# Patient Record
Sex: FEMALE | Race: WHITE | NOT HISPANIC OR LATINO | URBAN - METROPOLITAN AREA
[De-identification: names, ages, dates, MRNs, and addresses within clinical notes are randomized per-mention and may not be internally consistent; named-entity substitution may affect disease eponyms.]

---

## 2021-01-05 NOTE — PATIENT DISCUSSION
Stopped Today: Pred Forte (prednisolone acetate): drops,suspension: 1% 1 drop four times a day as directed into left eye 12-

## 2021-01-05 NOTE — PATIENT DISCUSSION
Stopped Today: ofloxacin (ofloxacin): drops: 0.3% 1 drop four times a day as directed into right eye

## 2022-02-16 ENCOUNTER — NEW PATIENT (OUTPATIENT)
Dept: URBAN - METROPOLITAN AREA CLINIC 14 | Facility: CLINIC | Age: 62
End: 2022-02-16

## 2022-02-16 DIAGNOSIS — H25.13: ICD-10-CM

## 2022-02-16 PROCEDURE — 92004 COMPRE OPH EXAM NEW PT 1/>: CPT

## 2022-02-16 PROCEDURE — 92015 DETERMINE REFRACTIVE STATE: CPT

## 2022-02-16 ASSESSMENT — VISUAL ACUITY
OS_BCVA: 20/25
OD_BCVA: 20/30
OU_CC: 20/60
OS_CC: 20/70
OD_CC: 20/70

## 2022-02-16 ASSESSMENT — KERATOMETRY
OS_AXISANGLE2_DEGREES: 56
OD_K1POWER_DIOPTERS: 40.75
OD_AXISANGLE_DEGREES: 106
OS_K1POWER_DIOPTERS: 41.50
OS_AXISANGLE_DEGREES: 146
OS_K2POWER_DIOPTERS: 42.25
OD_K2POWER_DIOPTERS: 41.00
OD_AXISANGLE2_DEGREES: 16

## 2022-02-16 ASSESSMENT — TONOMETRY
OD_IOP_MMHG: 13
OS_IOP_MMHG: 14

## 2024-02-19 ENCOUNTER — EMERGENCY VISIT (OUTPATIENT)
Dept: URBAN - METROPOLITAN AREA CLINIC 16 | Facility: CLINIC | Age: 64
End: 2024-02-19

## 2024-02-19 DIAGNOSIS — H00.15: ICD-10-CM

## 2024-02-19 DIAGNOSIS — H02.886: ICD-10-CM

## 2024-02-19 DIAGNOSIS — H02.883: ICD-10-CM

## 2024-02-19 PROCEDURE — 99214 OFFICE O/P EST MOD 30 MIN: CPT

## 2024-02-19 ASSESSMENT — VISUAL ACUITY
OU_CC: 20/25
OD_CC: 20/70
OS_CC: 20/20-1

## 2024-02-19 ASSESSMENT — KERATOMETRY
OD_AXISANGLE_DEGREES: 106
OD_K2POWER_DIOPTERS: 41.00
OS_K2POWER_DIOPTERS: 42.25
OS_AXISANGLE2_DEGREES: 56
OS_AXISANGLE_DEGREES: 146
OD_AXISANGLE2_DEGREES: 16
OS_K1POWER_DIOPTERS: 41.50
OD_K1POWER_DIOPTERS: 40.75

## 2024-02-19 ASSESSMENT — TONOMETRY
OD_IOP_MMHG: 19
OS_IOP_MMHG: 19

## 2024-03-15 ENCOUNTER — COMPREHENSIVE EXAM (OUTPATIENT)
Dept: URBAN - METROPOLITAN AREA CLINIC 16 | Facility: CLINIC | Age: 64
End: 2024-03-15

## 2024-03-15 DIAGNOSIS — H02.886: ICD-10-CM

## 2024-03-15 DIAGNOSIS — H04.123: ICD-10-CM

## 2024-03-15 DIAGNOSIS — H02.883: ICD-10-CM

## 2024-03-15 DIAGNOSIS — H43.813: ICD-10-CM

## 2024-03-15 DIAGNOSIS — H25.13: ICD-10-CM

## 2024-03-15 PROCEDURE — 92014 COMPRE OPH EXAM EST PT 1/>: CPT

## 2024-03-15 PROCEDURE — 92015 DETERMINE REFRACTIVE STATE: CPT

## 2024-03-15 ASSESSMENT — KERATOMETRY
OD_AXISANGLE_DEGREES: 106
OD_K1POWER_DIOPTERS: 41.00
OD_AXISANGLE2_DEGREES: 128
OS_K1POWER_DIOPTERS: 41.50
OD_K2POWER_DIOPTERS: 40.50
OS_AXISANGLE_DEGREES: 139
OS_AXISANGLE2_DEGREES: 49
OD_K2POWER_DIOPTERS: 41.00
OS_AXISANGLE2_DEGREES: 56
OD_K1POWER_DIOPTERS: 40.75
OD_AXISANGLE_DEGREES: 38
OD_AXISANGLE2_DEGREES: 16
OS_K2POWER_DIOPTERS: 42.25
OS_AXISANGLE_DEGREES: 146

## 2024-03-15 ASSESSMENT — TONOMETRY
OD_IOP_MMHG: 18
OS_IOP_MMHG: 19

## 2024-03-15 ASSESSMENT — VISUAL ACUITY
OD_CC: 20/40+0
OS_CC: 20/25+2

## 2024-04-05 ENCOUNTER — FOLLOW UP (OUTPATIENT)
Dept: URBAN - METROPOLITAN AREA CLINIC 14 | Facility: CLINIC | Age: 64
End: 2024-04-05

## 2024-04-05 DIAGNOSIS — H02.883: ICD-10-CM

## 2024-04-05 DIAGNOSIS — H02.886: ICD-10-CM

## 2024-04-05 DIAGNOSIS — H25.13: ICD-10-CM

## 2024-04-05 DIAGNOSIS — H04.123: ICD-10-CM

## 2024-04-05 PROCEDURE — 99214 OFFICE O/P EST MOD 30 MIN: CPT

## 2024-04-05 ASSESSMENT — KERATOMETRY
OS_AXISANGLE_DEGREES: 146
OD_K1POWER_DIOPTERS: 40.75
OD_AXISANGLE_DEGREES: 106
OD_AXISANGLE_DEGREES: 38
OS_AXISANGLE2_DEGREES: 49
OD_AXISANGLE2_DEGREES: 16
OD_K2POWER_DIOPTERS: 41.00
OS_K2POWER_DIOPTERS: 42.25
OD_K2POWER_DIOPTERS: 40.50
OS_AXISANGLE_DEGREES: 139
OS_AXISANGLE2_DEGREES: 56
OD_K1POWER_DIOPTERS: 41.00
OD_AXISANGLE2_DEGREES: 128
OS_K1POWER_DIOPTERS: 41.50

## 2024-04-05 ASSESSMENT — VISUAL ACUITY
OS_CC: 20/20-2
OD_BCVA: 20/50
OD_PH: 20/30
OD_CC: 20/50+2

## 2024-04-05 ASSESSMENT — TONOMETRY
OD_IOP_MMHG: 16
OS_IOP_MMHG: 17

## 2024-04-08 ENCOUNTER — ESTABLISHED PATIENT (OUTPATIENT)
Dept: URBAN - METROPOLITAN AREA CLINIC 14 | Facility: CLINIC | Age: 64
End: 2024-04-08

## 2024-04-08 DIAGNOSIS — H25.13: ICD-10-CM

## 2024-04-08 DIAGNOSIS — Z98.890: ICD-10-CM

## 2024-04-08 PROCEDURE — 99214 OFFICE O/P EST MOD 30 MIN: CPT

## 2024-04-08 PROCEDURE — 99199ADVT ADVANCED VISION TESTING

## 2024-04-08 PROCEDURE — 92136 OPHTHALMIC BIOMETRY: CPT

## 2024-04-08 ASSESSMENT — VISUAL ACUITY
OD_BCVA: 20/50
OD_PH: 20/40
OS_BCVA: 20/50
OS_CC: 20/40-1
OD_CC: 20/60

## 2024-04-08 ASSESSMENT — KERATOMETRY
OS_AXISANGLE2_DEGREES: 56
OS_AXISANGLE_DEGREES: 146
OD_K1POWER_DIOPTERS: 40.75
OD_AXISANGLE2_DEGREES: 16
OD_K2POWER_DIOPTERS: 41.00
OS_K1POWER_DIOPTERS: 41.50
OD_AXISANGLE_DEGREES: 106
OS_K2POWER_DIOPTERS: 42.25

## 2024-04-08 ASSESSMENT — TONOMETRY
OD_IOP_MMHG: 10
OS_IOP_MMHG: 14

## 2024-04-16 ENCOUNTER — ESTABLISHED PATIENT (OUTPATIENT)
Dept: URBAN - METROPOLITAN AREA CLINIC 18 | Facility: CLINIC | Age: 64
End: 2024-04-16

## 2024-04-16 DIAGNOSIS — H35.372: ICD-10-CM

## 2024-04-16 DIAGNOSIS — H52.13: ICD-10-CM

## 2024-04-16 DIAGNOSIS — H35.413: ICD-10-CM

## 2024-04-16 DIAGNOSIS — H43.813: ICD-10-CM

## 2024-04-16 PROCEDURE — 92134 CPTRZ OPH DX IMG PST SGM RTA: CPT

## 2024-04-16 PROCEDURE — 92201 OPSCPY EXTND RTA DRAW UNI/BI: CPT

## 2024-04-16 PROCEDURE — 67145 PROPH RTA DTCHMNT PC: CPT

## 2024-04-16 PROCEDURE — 99214 OFFICE O/P EST MOD 30 MIN: CPT | Mod: 25

## 2024-04-16 ASSESSMENT — KERATOMETRY
OD_AXISANGLE_DEGREES: 106
OD_AXISANGLE2_DEGREES: 16
OS_K2POWER_DIOPTERS: 42.25
OS_AXISANGLE2_DEGREES: 56
OS_AXISANGLE_DEGREES: 146
OD_K1POWER_DIOPTERS: 40.75
OD_K2POWER_DIOPTERS: 41.00
OS_K1POWER_DIOPTERS: 41.50

## 2024-04-16 ASSESSMENT — VISUAL ACUITY
OD_PH: 20/50
OD_CC: 20/80
OS_CC: 20/25

## 2024-04-23 ENCOUNTER — CLINIC PROCEDURE ONLY (OUTPATIENT)
Dept: URBAN - METROPOLITAN AREA CLINIC 18 | Facility: CLINIC | Age: 64
End: 2024-04-23

## 2024-04-23 DIAGNOSIS — H35.413: ICD-10-CM

## 2024-04-23 PROCEDURE — 67145 PROPH RTA DTCHMNT PC: CPT

## 2024-04-23 ASSESSMENT — TONOMETRY
OD_IOP_MMHG: 11
OS_IOP_MMHG: 13

## 2024-04-23 ASSESSMENT — VISUAL ACUITY
OS_CC: 20/20
OD_PH: 20/25-2
OD_CC: 20/50-2

## 2024-05-03 ENCOUNTER — POST-OP (OUTPATIENT)
Dept: URBAN - METROPOLITAN AREA CLINIC 11 | Facility: CLINIC | Age: 64
End: 2024-05-03

## 2024-05-03 DIAGNOSIS — H35.413: ICD-10-CM

## 2024-05-03 DIAGNOSIS — H35.372: ICD-10-CM

## 2024-05-03 DIAGNOSIS — H43.813: ICD-10-CM

## 2024-05-03 PROCEDURE — 99024 POSTOP FOLLOW-UP VISIT: CPT

## 2024-05-03 PROCEDURE — 92134 CPTRZ OPH DX IMG PST SGM RTA: CPT

## 2024-05-03 ASSESSMENT — VISUAL ACUITY
OS_CC: 20/30
OU_CC: 20/30
OD_PH: 20/50
OD_CC: 20/70

## 2024-05-03 ASSESSMENT — TONOMETRY
OS_IOP_MMHG: 12
OD_IOP_MMHG: 10

## 2024-05-07 ENCOUNTER — POST-OP (OUTPATIENT)
Dept: URBAN - METROPOLITAN AREA CLINIC 10 | Facility: CLINIC | Age: 64
End: 2024-05-07

## 2024-05-07 DIAGNOSIS — Z96.1: ICD-10-CM

## 2024-05-07 PROCEDURE — P6698455 NON-COMANAGED ADVANCED PO

## 2024-05-08 ASSESSMENT — VISUAL ACUITY
OD_SC: 20/40+1
OS_CC: 20/40
OS_BCVA: 20/50

## 2024-05-08 ASSESSMENT — TONOMETRY
OD_IOP_MMHG: 16
OS_IOP_MMHG: 14

## 2024-05-09 ENCOUNTER — POST-OP (OUTPATIENT)
Dept: URBAN - METROPOLITAN AREA CLINIC 10 | Facility: CLINIC | Age: 64
End: 2024-05-09

## 2024-05-09 DIAGNOSIS — Z96.1: ICD-10-CM

## 2024-05-09 PROCEDURE — P6698455 NON-COMANAGED ADVANCED PO

## 2024-05-09 ASSESSMENT — VISUAL ACUITY
OD_SC: 20/30
OS_SC: J7
OS_SC: 20/30-1

## 2024-05-09 ASSESSMENT — TONOMETRY
OS_IOP_MMHG: 28
OD_IOP_MMHG: 22

## 2024-06-03 ENCOUNTER — POST-OP (OUTPATIENT)
Dept: URBAN - METROPOLITAN AREA CLINIC 14 | Facility: CLINIC | Age: 64
End: 2024-06-03

## 2024-06-03 DIAGNOSIS — Z96.1: ICD-10-CM

## 2024-06-03 PROCEDURE — 66984LALA LAL ADJUSTMENT: Mod: NC,RT

## 2024-06-03 PROCEDURE — 66984LALA LAL ADJUSTMENT: Mod: NC,LT

## 2024-06-03 ASSESSMENT — VISUAL ACUITY
OS_BCVA: 20/20
OS_SC: 20/25-1
OD_SC: 20/25-1
OD_SC: J7
OD_BCVA: 20/25
OS_SC: J7

## 2024-06-06 ENCOUNTER — POST-OP (OUTPATIENT)
Dept: URBAN - METROPOLITAN AREA CLINIC 14 | Facility: CLINIC | Age: 64
End: 2024-06-06

## 2024-06-06 DIAGNOSIS — Z96.1: ICD-10-CM

## 2024-06-06 PROCEDURE — 66984LALA LAL ADJUSTMENT: Mod: NC,RT

## 2024-06-06 PROCEDURE — 66984LALA LAL ADJUSTMENT: Mod: PW,LT

## 2024-06-06 ASSESSMENT — VISUAL ACUITY
OS_SC: 20/50
OD_SC: 20/30
OS_SC: J2

## 2024-06-10 ENCOUNTER — POST-OP (OUTPATIENT)
Dept: URBAN - METROPOLITAN AREA CLINIC 14 | Facility: CLINIC | Age: 64
End: 2024-06-10

## 2024-06-10 DIAGNOSIS — Z96.1: ICD-10-CM

## 2024-06-10 PROCEDURE — 66984LALA LAL ADJUSTMENT: Mod: NC,RT

## 2024-06-10 PROCEDURE — 66984LALA LAL ADJUSTMENT: Mod: NC,LT

## 2024-06-26 ENCOUNTER — POST-OP (OUTPATIENT)
Dept: URBAN - METROPOLITAN AREA CLINIC 14 | Facility: CLINIC | Age: 64
End: 2024-06-26

## 2024-06-26 DIAGNOSIS — Z96.1: ICD-10-CM

## 2024-06-26 PROCEDURE — 66984LALA LAL ADJUSTMENT: Mod: NC,LT

## 2024-06-26 PROCEDURE — 66984LALA LAL ADJUSTMENT: Mod: NC,RT

## 2024-06-26 ASSESSMENT — VISUAL ACUITY
OD_BCVA: 20/30
OS_SC: 20/200+1
OS_BCVA: 20/30
OD_SC: 20/30

## 2024-07-03 ENCOUNTER — POST-OP (OUTPATIENT)
Dept: URBAN - METROPOLITAN AREA CLINIC 14 | Facility: CLINIC | Age: 64
End: 2024-07-03

## 2024-07-03 DIAGNOSIS — Z96.1: ICD-10-CM

## 2024-07-03 PROCEDURE — 66984LALA LAL ADJUSTMENT: Mod: NC,LT

## 2024-07-03 PROCEDURE — 66984LALA LAL ADJUSTMENT: Mod: NC,RT

## 2024-07-03 ASSESSMENT — VISUAL ACUITY
OD_SC: 20/40
OS_SC: J1
OS_BCVA: 20/30
OS_SC: 20/100
OD_BCVA: 20/40

## 2024-07-05 ASSESSMENT — VISUAL ACUITY
OD_BCVA: 20/40
OD_SC: 20/40
OS_SC: J1
OS_BCVA: 20/30
OS_SC: 20/100

## 2024-07-08 ENCOUNTER — POST-OP (OUTPATIENT)
Dept: URBAN - METROPOLITAN AREA CLINIC 14 | Facility: CLINIC | Age: 64
End: 2024-07-08

## 2024-07-08 DIAGNOSIS — Z96.1: ICD-10-CM

## 2024-07-08 PROCEDURE — 66984LALA LAL ADJUSTMENT: Mod: NC,LT

## 2024-11-18 ENCOUNTER — POST-OP (OUTPATIENT)
Dept: URBAN - METROPOLITAN AREA CLINIC 16 | Facility: CLINIC | Age: 64
End: 2024-11-18

## 2024-11-18 DIAGNOSIS — Z96.1: ICD-10-CM

## 2024-11-18 PROCEDURE — P6698455 NON-COMANAGED ADVANCED PO

## 2025-05-23 ENCOUNTER — COMPREHENSIVE EXAM (OUTPATIENT)
Age: 65
End: 2025-05-23

## 2025-05-23 DIAGNOSIS — H02.886: ICD-10-CM

## 2025-05-23 DIAGNOSIS — H02.883: ICD-10-CM

## 2025-05-23 DIAGNOSIS — H04.123: ICD-10-CM

## 2025-05-23 PROCEDURE — 99214 OFFICE O/P EST MOD 30 MIN: CPT

## 2025-07-08 ENCOUNTER — COMPREHENSIVE EXAM (OUTPATIENT)
Age: 65
End: 2025-07-08

## 2025-07-08 DIAGNOSIS — H43.813: ICD-10-CM

## 2025-07-08 DIAGNOSIS — H35.372: ICD-10-CM

## 2025-07-08 DIAGNOSIS — H35.413: ICD-10-CM

## 2025-07-08 PROCEDURE — 92201 OPSCPY EXTND RTA DRAW UNI/BI: CPT

## 2025-07-08 PROCEDURE — 92014 COMPRE OPH EXAM EST PT 1/>: CPT

## 2025-07-08 PROCEDURE — 92134 CPTRZ OPH DX IMG PST SGM RTA: CPT

## 2025-07-21 ENCOUNTER — FOLLOW UP (OUTPATIENT)
Age: 65
End: 2025-07-21

## 2025-07-21 DIAGNOSIS — H02.886: ICD-10-CM

## 2025-07-21 DIAGNOSIS — H04.123: ICD-10-CM

## 2025-07-21 DIAGNOSIS — H02.883: ICD-10-CM

## 2025-07-21 PROCEDURE — 99213 OFFICE O/P EST LOW 20 MIN: CPT
